# Patient Record
Sex: MALE | Race: WHITE | NOT HISPANIC OR LATINO
[De-identification: names, ages, dates, MRNs, and addresses within clinical notes are randomized per-mention and may not be internally consistent; named-entity substitution may affect disease eponyms.]

---

## 2018-11-07 ENCOUNTER — APPOINTMENT (OUTPATIENT)
Dept: UROLOGY | Facility: CLINIC | Age: 68
End: 2018-11-07
Payer: MEDICARE

## 2018-11-07 VITALS
BODY MASS INDEX: 23.74 KG/M2 | DIASTOLIC BLOOD PRESSURE: 70 MMHG | WEIGHT: 185 LBS | HEIGHT: 74 IN | SYSTOLIC BLOOD PRESSURE: 134 MMHG | HEART RATE: 65 BPM

## 2018-11-07 DIAGNOSIS — N28.1 CYST OF KIDNEY, ACQUIRED: ICD-10-CM

## 2018-11-07 DIAGNOSIS — Z80.1 FAMILY HISTORY OF MALIGNANT NEOPLASM OF TRACHEA, BRONCHUS AND LUNG: ICD-10-CM

## 2018-11-07 DIAGNOSIS — Z84.89 FAMILY HISTORY OF OTHER SPECIFIED CONDITIONS: ICD-10-CM

## 2018-11-07 DIAGNOSIS — R31.29 OTHER MICROSCOPIC HEMATURIA: ICD-10-CM

## 2018-11-07 DIAGNOSIS — N20.0 CALCULUS OF KIDNEY: ICD-10-CM

## 2018-11-07 LAB
BILIRUB UR QL STRIP: NORMAL
CLARITY UR: CLEAR
COLLECTION METHOD: NORMAL
GLUCOSE UR-MCNC: NORMAL
HCG UR QL: NORMAL EU/DL
HGB UR QL STRIP.AUTO: NORMAL
KETONES UR-MCNC: NORMAL
LEUKOCYTE ESTERASE UR QL STRIP: 25
NITRITE UR QL STRIP: NORMAL
PH UR STRIP: 5
PROT UR STRIP-MCNC: NORMAL
SP GR UR STRIP: 1025

## 2018-11-07 PROCEDURE — 99213 OFFICE O/P EST LOW 20 MIN: CPT

## 2018-11-07 PROCEDURE — 81003 URINALYSIS AUTO W/O SCOPE: CPT | Mod: QW

## 2019-11-12 ENCOUNTER — APPOINTMENT (OUTPATIENT)
Dept: UROLOGY | Facility: CLINIC | Age: 69
End: 2019-11-12
Payer: MEDICARE

## 2019-11-12 VITALS — BODY MASS INDEX: 23.74 KG/M2 | WEIGHT: 185 LBS | HEIGHT: 74 IN

## 2019-11-12 PROCEDURE — 99213 OFFICE O/P EST LOW 20 MIN: CPT

## 2019-11-12 NOTE — REVIEW OF SYSTEMS
[Fever] : no fever [Shortness Of Breath] : no shortness of breath [Chest Pain] : no chest pain [Abdominal Pain] : no abdominal pain [Confused] : no confusion [Dysuria] : no dysuria

## 2019-11-12 NOTE — PHYSICAL EXAM
[Prostate Tenderness] : the prostate was not tender [General Appearance - In No Acute Distress] : no acute distress [No Prostate Nodules] : no prostate nodules [Prostate Size ___ (0-4)] : prostate size [unfilled] (scale: 0-4) [Edema] : no peripheral edema [] : no respiratory distress [Oriented To Time, Place, And Person] : oriented to person, place, and time [Normal Station and Gait] : the gait and station were normal for the patient's age

## 2020-05-12 ENCOUNTER — APPOINTMENT (OUTPATIENT)
Dept: UROLOGY | Facility: CLINIC | Age: 70
End: 2020-05-12

## 2020-05-18 ENCOUNTER — APPOINTMENT (OUTPATIENT)
Dept: UROLOGY | Facility: CLINIC | Age: 70
End: 2020-05-18
Payer: MEDICARE

## 2020-05-18 ENCOUNTER — TRANSCRIPTION ENCOUNTER (OUTPATIENT)
Age: 70
End: 2020-05-18

## 2020-05-18 PROCEDURE — 99213 OFFICE O/P EST LOW 20 MIN: CPT | Mod: 95

## 2020-05-18 NOTE — PHYSICAL EXAM
[Normal Appearance] : normal appearance [General Appearance - In No Acute Distress] : no acute distress [Abdomen Tenderness] : non-tender [Costovertebral Angle Tenderness] : no ~M costovertebral angle tenderness [Edema] : no peripheral edema [] : no respiratory distress [Oriented To Time, Place, And Person] : oriented to person, place, and time

## 2020-05-18 NOTE — ASSESSMENT
[FreeTextEntry1] : Stable bph and luts with acceptable psa fully discussed with pt.  will continue to monitor it.  LUTS not bothersome enough to pt to want med or procedure.  He prefers behavioral modification re fliuid intake.  He attribute constipation to meds by cardiologist and prefers no treatment for it.  follow up re this with pmd

## 2020-05-18 NOTE — REVIEW OF SYSTEMS
[Constipation] : constipation [Fever] : no fever [Chest Pain] : no chest pain [Shortness Of Breath] : no shortness of breath [Cough] : no cough [Confused] : no confusion [Diarrhea] : no diarrhea

## 2020-05-18 NOTE — HISTORY OF PRESENT ILLNESS
[Home] : at home, [unfilled] , at the time of the visit. [Self] : self [Other Location: e.g. Home (Enter Location, City,State)___] : at [unfilled] [Patient] : the patient [Verbal consent obtained from patient] : the patient, [unfilled] [FreeTextEntry2] : at 1007 on 5/18/20 [FreeTextEntry1] : phone number [phone]\par email of [email or web address]\par \par 70 year old with bph and luts.  6 months ago, he had a increase in his psa to 3.0, from 2.1 2 years ago. PSA now is 3.2.  He has no change in urination with variable urinary flow which is normal in the daytime, and variable at night, with occaisional hesitancy.  He has nocturia x 0-5 but usually 0-2, he attributes it to water intake.  There is no daytime frequency or urgency

## 2020-11-25 ENCOUNTER — NON-APPOINTMENT (OUTPATIENT)
Age: 70
End: 2020-11-25

## 2020-12-15 ENCOUNTER — APPOINTMENT (OUTPATIENT)
Dept: UROLOGY | Facility: CLINIC | Age: 70
End: 2020-12-15

## 2021-05-25 ENCOUNTER — APPOINTMENT (OUTPATIENT)
Dept: UROLOGY | Facility: CLINIC | Age: 71
End: 2021-05-25
Payer: MEDICARE

## 2021-05-25 VITALS — HEIGHT: 74 IN | TEMPERATURE: 97.9 F | BODY MASS INDEX: 23.74 KG/M2 | WEIGHT: 185 LBS

## 2021-05-25 DIAGNOSIS — R97.20 ELEVATED PROSTATE, SPECIFIC ANTIGEN [PSA]: ICD-10-CM

## 2021-05-25 PROCEDURE — 99213 OFFICE O/P EST LOW 20 MIN: CPT

## 2021-05-25 NOTE — REVIEW OF SYSTEMS
[Fever] : no fever [Feeling Poorly] : not feeling poorly [Feeling Tired] : not feeling tired [Nasal Discharge] : no nasal discharge [Sore Throat] : no sore throat [Chest Pain] : no chest pain [Shortness Of Breath] : no shortness of breath [Cough] : no cough [Abdominal Pain] : no abdominal pain [Dysuria] : no dysuria

## 2021-05-25 NOTE — ASSESSMENT
[Urinary Symptom or Sign (788.99\R39.89)] : implantation [FreeTextEntry1] : BPH with lower urinary tract symptoms for which patient does not want any treatment and feels he can live with it. PSA increase above baseline and we'll repeat it. If still elevated I discussed with him I recommend prostate biopsy

## 2021-05-25 NOTE — HISTORY OF PRESENT ILLNESS
[FreeTextEntry1] : 71-year-old with history of BPH and lower urinary tract symptoms. Approximately 6 months ago had worsening urinary obstructive symptoms for which tamsulosin was prescribed but patient never took it. He states he urinates with a variable urinary flow with some hesitancy on occasion and nocturia x0 to, no dysuria, no hematuria. PSA now is 4.6 compared to 3.2 one year ago

## 2021-06-30 ENCOUNTER — APPOINTMENT (OUTPATIENT)
Dept: UROLOGY | Facility: CLINIC | Age: 71
End: 2021-06-30
Payer: MEDICARE

## 2021-06-30 PROCEDURE — 99213 OFFICE O/P EST LOW 20 MIN: CPT

## 2021-06-30 NOTE — PHYSICAL EXAM
[Normal Appearance] : normal appearance [General Appearance - In No Acute Distress] : no acute distress [] : no respiratory distress [Normal Station and Gait] : the gait and station were normal for the patient's age [Oriented To Time, Place, And Person] : oriented to person, place, and time

## 2021-06-30 NOTE — ASSESSMENT
[Urinary Symptom or Sign (788.99\R39.89)] : implantation [FreeTextEntry1] : Acceptable PSA consistent with BPH. No change in urination and patient prefers no treatment. Remote history of stones with no sign of symptoms lack of stone disease. Image p.r.n.

## 2021-06-30 NOTE — HISTORY OF PRESENT ILLNESS
[FreeTextEntry1] : 71-year-old with history of BPH, lower urinary tract symptoms. Patient states no change in urination with occasional hesitancy, nocturia x0, no dysuria, no hematuria. He is variable urinary flow. Recently PSA increased to 4.6 so it was repeated and is now 3.1 which is back to his baseline.\par \par Remote history of stones no flank pain

## 2022-08-03 ENCOUNTER — APPOINTMENT (OUTPATIENT)
Dept: UROLOGY | Facility: CLINIC | Age: 72
End: 2022-08-03

## 2022-08-03 VITALS
HEART RATE: 71 BPM | BODY MASS INDEX: 23.74 KG/M2 | WEIGHT: 185 LBS | DIASTOLIC BLOOD PRESSURE: 67 MMHG | SYSTOLIC BLOOD PRESSURE: 134 MMHG | HEIGHT: 74 IN

## 2022-08-03 PROCEDURE — 99213 OFFICE O/P EST LOW 20 MIN: CPT

## 2022-08-03 NOTE — HISTORY OF PRESENT ILLNESS
[FreeTextEntry1] : 72-year-old with history of BPH and lower urinary tract symptoms.  No changes in urination since last year.  Patient reports weak urinary stream during first void.  He is not bothered by urination does not want to take any daily medication.  Patient denies dysuria and gross hematuria.  Patient has history of fluctuating PSA.  Last year PSA increased to 4.6 ng/mL.  And repeat PSA was 3.1 ng/mL.  Most recent PSA done April 2022 was 3.7 ng/mL.

## 2022-08-03 NOTE — ASSESSMENT
[FreeTextEntry1] : Stable BPH.  PSA within fluctuating range.\par Follow-up 1 year with repeat PSA.\par \par This visit we discussed different treatment options for BPH including procedures and medications.  Benefits and side effects of treatment options reviewed in detail. Patient does not request any treatment at this time.  [Urinary Symptom or Sign (788.99\R39.89)] : implantation

## 2022-09-22 ENCOUNTER — APPOINTMENT (OUTPATIENT)
Dept: CARDIOLOGY | Facility: CLINIC | Age: 72
End: 2022-09-22

## 2022-09-22 PROCEDURE — 93306 TTE W/DOPPLER COMPLETE: CPT

## 2022-10-12 ENCOUNTER — NON-APPOINTMENT (OUTPATIENT)
Age: 72
End: 2022-10-12

## 2022-10-19 ENCOUNTER — APPOINTMENT (OUTPATIENT)
Dept: CARDIOLOGY | Facility: CLINIC | Age: 72
End: 2022-10-19

## 2022-10-19 VITALS — HEIGHT: 74 IN | WEIGHT: 189 LBS | BODY MASS INDEX: 24.26 KG/M2

## 2022-10-19 PROCEDURE — 99214 OFFICE O/P EST MOD 30 MIN: CPT

## 2022-10-19 RX ORDER — VERAPAMIL HYDROCHLORIDE 120 MG/1
120 TABLET ORAL
Refills: 0 | Status: DISCONTINUED | COMMUNITY
End: 2022-10-19

## 2022-10-19 RX ORDER — VALSARTAN 160 MG/1
160 TABLET, COATED ORAL
Qty: 30 | Refills: 0 | Status: DISCONTINUED | COMMUNITY
Start: 2022-10-07

## 2022-10-19 RX ORDER — TAMSULOSIN HYDROCHLORIDE 0.4 MG/1
0.4 CAPSULE ORAL
Qty: 30 | Refills: 5 | Status: DISCONTINUED | COMMUNITY
Start: 2020-11-25 | End: 2022-10-19

## 2022-10-19 RX ORDER — ASPIRIN 325 MG/1
TABLET, FILM COATED ORAL
Refills: 0 | Status: DISCONTINUED | COMMUNITY
End: 2022-10-19

## 2022-10-19 RX ORDER — VERAPAMIL HYDROCHLORIDE 240 MG/1
240 TABLET ORAL
Qty: 90 | Refills: 3 | Status: ACTIVE | COMMUNITY
Start: 2022-07-21 | End: 1900-01-01

## 2022-10-19 RX ORDER — VERAPAMIL HYDROCHLORIDE 240 MG/1
240 TABLET ORAL
Refills: 0 | Status: DISCONTINUED | COMMUNITY

## 2022-10-19 NOTE — HISTORY OF PRESENT ILLNESS
[FreeTextEntry1] : pt WITH HTN, HLD, NSVT ON HOLTER IN 2017 WITH SOME SVT EPISODES, HDL 36, HTN. HIGH 10 YEAR RISK \par \par pt bp elevated 150/90, pt says bp with pmd says bp was 105/60 and was hypotensive on 10/7/22 and patient was a little lightheaded. \par pt says sits for a minute, no infection at time and no fever BUT HAD A COUGH AND TREATED ABX. \par

## 2022-12-21 ENCOUNTER — APPOINTMENT (OUTPATIENT)
Dept: NEUROLOGY | Facility: CLINIC | Age: 72
End: 2022-12-21

## 2022-12-21 VITALS
HEART RATE: 70 BPM | HEIGHT: 74 IN | WEIGHT: 185 LBS | DIASTOLIC BLOOD PRESSURE: 81 MMHG | BODY MASS INDEX: 23.74 KG/M2 | SYSTOLIC BLOOD PRESSURE: 236 MMHG

## 2022-12-21 DIAGNOSIS — I99.8 OTHER DISORDER OF CIRCULATORY SYSTEM: ICD-10-CM

## 2022-12-21 PROCEDURE — 99203 OFFICE O/P NEW LOW 30 MIN: CPT

## 2022-12-21 NOTE — ASSESSMENT
[FreeTextEntry1] :  mild intravascular calcification- I assure patient this is most likely secondary to aging and not a concerning sign  suggestive of a neurological disorder.

## 2022-12-21 NOTE — HISTORY OF PRESENT ILLNESS
[FreeTextEntry1] : It's a pleasure to see Mr. NERISSA MARQUEZ In the office today. He is a 72 year -  old man  who presents to the office today Because the MRI of the brain that he did recently showed evidence of mild vascular calcification.   even though he was told that if not a significant finding, he wanted to hear it from a neurologist.  There was not any significant intracranial pathology on the MRI report.

## 2022-12-23 VITALS — SYSTOLIC BLOOD PRESSURE: 136 MMHG | HEART RATE: 70 BPM | DIASTOLIC BLOOD PRESSURE: 81 MMHG

## 2022-12-28 ENCOUNTER — NON-APPOINTMENT (OUTPATIENT)
Age: 72
End: 2022-12-28

## 2022-12-28 DIAGNOSIS — Z78.9 OTHER SPECIFIED HEALTH STATUS: ICD-10-CM

## 2023-04-20 ENCOUNTER — APPOINTMENT (OUTPATIENT)
Dept: CARDIOLOGY | Facility: CLINIC | Age: 73
End: 2023-04-20
Payer: MEDICARE

## 2023-04-20 VITALS — HEART RATE: 52 BPM | SYSTOLIC BLOOD PRESSURE: 120 MMHG | DIASTOLIC BLOOD PRESSURE: 70 MMHG

## 2023-04-20 VITALS — WEIGHT: 187 LBS | BODY MASS INDEX: 24 KG/M2 | HEIGHT: 74 IN

## 2023-04-20 DIAGNOSIS — I47.20 VENTRICULAR TACHYCARDIA, UNSPECIFIED: ICD-10-CM

## 2023-04-20 PROCEDURE — 99214 OFFICE O/P EST MOD 30 MIN: CPT

## 2023-04-20 NOTE — HISTORY OF PRESENT ILLNESS
[FreeTextEntry1] : pt WITH HTN, HLD, NSVT ON HOLTER IN 2017 WITH SOME SVT EPISODES, HDL 36, HTN. HIGH 10 YEAR RISK PREDM, bradycardia \par \par \par pt bp elevated 150/90, pt says bp with pmd says bp was 105/60 and was hypotensive on 10/7/22 and patient was a little lightheaded. \par pt says sits for a minute, no infection at time and no fever BUT HAD A COUGH AND TREATED ABX. \par \par 23: \par 23: HDL 37, LDl 73, T A1c: 5.7 GFR: 93, NTBNP: 72, \par pt with predm and hdl lwo and only walking briskly works in [employer] b ut denies cp or sob. palpitaitons controlled on verapamil and rare correctly.

## 2023-04-20 NOTE — DISCUSSION/SUMMARY
[FreeTextEntry1] : mild anemia \par f/u with pmd \par colonoscopy negative last year \par pt with jose a but ? due to verapamil \par continue statin \par exercise for HDL \par get 2 d echo \par bloodwork f/u in 6 months

## 2023-04-20 NOTE — HISTORY OF PRESENT ILLNESS
[FreeTextEntry1] : pt WITH HTN, HLD, NSVT ON HOLTER IN 2017 WITH SOME SVT EPISODES, HDL 36, HTN. HIGH 10 YEAR RISK PREDM, bradycardia \par \par \par pt bp elevated 150/90, pt says bp with pmd says bp was 105/60 and was hypotensive on 10/7/22 and patient was a little lightheaded. \par pt says sits for a minute, no infection at time and no fever BUT HAD A COUGH AND TREATED ABX. \par \par 23: \par 23: HDL 37, LDl 73, T A1c: 5.7 GFR: 93, NTBNP: 72, \par pt with predm and hdl lwo and only walking briskly works in [employer] b ut denies cp or sob. palpitaitons controlled on verapamil and rare correctly.

## 2023-08-02 ENCOUNTER — APPOINTMENT (OUTPATIENT)
Dept: UROLOGY | Facility: CLINIC | Age: 73
End: 2023-08-02
Payer: MEDICARE

## 2023-08-02 VITALS
HEIGHT: 74 IN | DIASTOLIC BLOOD PRESSURE: 72 MMHG | OXYGEN SATURATION: 98 % | SYSTOLIC BLOOD PRESSURE: 142 MMHG | WEIGHT: 187 LBS | HEART RATE: 59 BPM | RESPIRATION RATE: 18 BRPM | BODY MASS INDEX: 24 KG/M2 | TEMPERATURE: 98.3 F

## 2023-08-02 DIAGNOSIS — R39.9 UNSPECIFIED SYMPTOMS AND SIGNS INVOLVING THE GENITOURINARY SYSTEM: ICD-10-CM

## 2023-08-02 DIAGNOSIS — N40.1 BENIGN PROSTATIC HYPERPLASIA WITH LOWER URINARY TRACT SYMPMS: ICD-10-CM

## 2023-08-02 DIAGNOSIS — N13.8 BENIGN PROSTATIC HYPERPLASIA WITH LOWER URINARY TRACT SYMPMS: ICD-10-CM

## 2023-08-02 PROCEDURE — 99213 OFFICE O/P EST LOW 20 MIN: CPT

## 2023-08-02 NOTE — HISTORY OF PRESENT ILLNESS
[FreeTextEntry1] : Patient is a 73-year-old history of BPH and lower urinary tract symptoms.  No change in urination since last year.  He reports weak urinary stream during first void.  He continues to not be bothered by this and does not want any treatment.  He denies any dysuria, gross hematuria.  Patient does have history of fluctuating PSA as high as 4.6 ng/mL.  His PSA April 2022 was 3.7 ng/mL.  PSA July 2023 is 3.2 ng/mL.

## 2023-08-02 NOTE — ASSESSMENT
[FreeTextEntry1] :  Urinary symptoms stable.  PSA stable.  Follow-up 1 year with repeat PSA [Urinary Symptom or Sign (788.99\R39.89)] : implantation

## 2023-09-01 ENCOUNTER — APPOINTMENT (OUTPATIENT)
Dept: ORTHOPEDIC SURGERY | Facility: CLINIC | Age: 73
End: 2023-09-01
Payer: MEDICARE

## 2023-09-01 DIAGNOSIS — S33.5XXA SPRAIN OF LIGAMENTS OF LUMBAR SPINE, INITIAL ENCOUNTER: ICD-10-CM

## 2023-09-01 PROCEDURE — 99203 OFFICE O/P NEW LOW 30 MIN: CPT

## 2023-09-01 NOTE — PHYSICAL EXAM
[de-identified] : TTP midline spine and paraspinal musculature  Strength                                          Hip flexor   Right: 5/5; Left: 5/5                              Knee extensor     Right: 5/5; Left: 5/5                      Ankle dorsiflexion   Right: 5/5; Left: 5/5                   EHL           Right: 5/5; Left: 5/5                                 Ankle plantarflexion       Right: 5/5; Left: 5/5  Sensation L1   Right: 2/2; Left: 2/2 L2   Right: 2/2; Left: 2/2 L3   Right: 2/2; Left: 2/2 L4   Right: 2/2; Left: 2/2 L5   Right: 2/2; Left: 2/2 S1   Right: 2/2; Left: 2/2  Reflexes Patella   Right: 2+; Left 2+ Achilles   Right: 2+; Left 2+ Clonus  Right: absent; L: absent

## 2023-09-01 NOTE — DATA REVIEWED
[FreeTextEntry1] : I reviewed the patient's AP lateral and oblique lumbar x-rays as well as AP and lateral thoracic x-rays.  There is disc degeneration.  No instability no fractures no dislocations.

## 2023-09-01 NOTE — DISCUSSION/SUMMARY
[de-identified] : 73-year-old male with 3 weeks history of low back pain superimposed on very mild pains in his low back for many years now.  Given patient prescription for physical therapy.  I discussed with him this is mostly a lumbar sprain with some mild degeneration.  If he continues to have pain and follow-up with his wife's pain management doctor.  He said he cannot take anti-inflammatories I advised him to take Tylenol and cyclobenzaprine as needed.  No surgical intervention.

## 2023-09-01 NOTE — HISTORY OF PRESENT ILLNESS
[de-identified] : 73-year-old male presents with 3 weeks of low back pain.  Patient went to urgent care.  They ordered an x-rays.  He has those with him.  They gave him a prescription for cyclobenzaprine.  He denies any pain radiating down his legs.  Denies numbness or tingling.  He has not been to physical therapy yet. He has not been to pain management yet.  No loss of bowel or bowel.

## 2023-10-19 ENCOUNTER — APPOINTMENT (OUTPATIENT)
Dept: CARDIOLOGY | Facility: CLINIC | Age: 73
End: 2023-10-19
Payer: MEDICARE

## 2023-10-19 VITALS — HEART RATE: 61 BPM | DIASTOLIC BLOOD PRESSURE: 90 MMHG | SYSTOLIC BLOOD PRESSURE: 160 MMHG

## 2023-10-19 VITALS — BODY MASS INDEX: 23.1 KG/M2 | OXYGEN SATURATION: 97 % | HEIGHT: 74 IN | HEART RATE: 61 BPM | WEIGHT: 180 LBS

## 2023-10-19 DIAGNOSIS — R93.1 ABNORMAL FINDINGS ON DIAGNOSTIC IMAGING OF HEART AND CORONARY CIRCULATION: ICD-10-CM

## 2023-10-19 PROCEDURE — 93306 TTE W/DOPPLER COMPLETE: CPT

## 2023-10-19 PROCEDURE — 99214 OFFICE O/P EST MOD 30 MIN: CPT

## 2023-10-19 RX ORDER — VALSARTAN 320 MG/1
320 TABLET, COATED ORAL DAILY
Qty: 90 | Refills: 3 | Status: ACTIVE | COMMUNITY
Start: 1900-01-01 | End: 1900-01-01

## 2023-10-25 PROBLEM — R93.1 ABNORMAL ECHOCARDIOGRAM: Status: ACTIVE | Noted: 2023-10-25

## 2023-10-26 ENCOUNTER — APPOINTMENT (OUTPATIENT)
Dept: CARDIOLOGY | Facility: CLINIC | Age: 73
End: 2023-10-26
Payer: MEDICARE

## 2023-10-26 VITALS — DIASTOLIC BLOOD PRESSURE: 80 MMHG | HEART RATE: 70 BPM | SYSTOLIC BLOOD PRESSURE: 138 MMHG

## 2023-10-26 VITALS — WEIGHT: 180 LBS | HEIGHT: 74 IN | BODY MASS INDEX: 23.1 KG/M2

## 2023-10-26 PROCEDURE — 99213 OFFICE O/P EST LOW 20 MIN: CPT

## 2023-10-26 RX ORDER — METOPROLOL SUCCINATE 50 MG/1
50 TABLET, EXTENDED RELEASE ORAL DAILY
Qty: 90 | Refills: 3 | Status: ACTIVE | COMMUNITY
Start: 2023-10-26 | End: 1900-01-01

## 2023-10-26 RX ORDER — KRILL/OM-3/DHA/EPA/PHOSPHO/AST 1000-230MG
81 CAPSULE ORAL
Refills: 0 | Status: ACTIVE | COMMUNITY

## 2023-11-02 ENCOUNTER — TRANSCRIPTION ENCOUNTER (OUTPATIENT)
Age: 73
End: 2023-11-02

## 2023-11-16 ENCOUNTER — APPOINTMENT (OUTPATIENT)
Dept: CARDIOLOGY | Facility: CLINIC | Age: 73
End: 2023-11-16
Payer: MEDICARE

## 2023-11-16 PROCEDURE — 93978 VASCULAR STUDY: CPT

## 2023-12-20 ENCOUNTER — OUTPATIENT (OUTPATIENT)
Dept: OUTPATIENT SERVICES | Facility: HOSPITAL | Age: 73
LOS: 1 days | End: 2023-12-20
Payer: MEDICARE

## 2023-12-20 ENCOUNTER — RESULT REVIEW (OUTPATIENT)
Age: 73
End: 2023-12-20

## 2023-12-20 DIAGNOSIS — Z00.8 ENCOUNTER FOR OTHER GENERAL EXAMINATION: ICD-10-CM

## 2023-12-20 DIAGNOSIS — R93.1 ABNORMAL FINDINGS ON DIAGNOSTIC IMAGING OF HEART AND CORONARY CIRCULATION: ICD-10-CM

## 2023-12-20 PROCEDURE — 75574 CT ANGIO HRT W/3D IMAGE: CPT

## 2023-12-20 PROCEDURE — 75574 CT ANGIO HRT W/3D IMAGE: CPT | Mod: 26,MH

## 2023-12-21 DIAGNOSIS — R93.1 ABNORMAL FINDINGS ON DIAGNOSTIC IMAGING OF HEART AND CORONARY CIRCULATION: ICD-10-CM

## 2023-12-21 NOTE — HISTORY OF PRESENT ILLNESS
[FreeTextEntry1] : pt WITH HTN, HLD, NSVT ON HOLTER IN 2017 WITH SOME SVT EPISODES, HDL 36, HTN. HIGH 10 YEAR RISK PREDM, bradycardia   pt bp elevated 150/90, pt says bp with pmd says bp was 105/60 and was hypotensive on 10/7/22 and patient was a little lightheaded.  pt says sits for a minute, no infection at time and no fever BUT HAD A COUGH AND TREATED ABX.   23:  23: HDL 37, LDL73, T A1c: 5.7 GFR: 93, NTBNP: 72,  pt with predm and hdl lwo and only walking briskly works in [employer] but denies cp or sob. palpitaitons controlled on verapamil and rare currently  10/19/23: pt had bloodwork done 2 days ago and not received yet. pt had echo today. pt had back issues and had ct scan done and reviewed. CT SHOWED CALCIFACTION IN AORTA. Pt working and walking with no issues, pt was predm and tried to correct but A1c was 6 on last test.  pt taking verpamil/valsartan/atorvastatin and bp high today.  HDL: 36, T, LDL: 69, APOB: 67, LPA: 33, A1C: 5.5  10/19/23: EF: 65%, LVMI: 106, E' sept: 0.1, E/e': 8, ? sept hypokinesis, DD1, mild LAE, mild JANNET, mild TR, mild LVH. Patient given echo results. CTA ordered for ? sept hypokinesis.   10/25/23: Patient here for BP check Valsartan increased last visit to 320mg.   23: d/w patient CTA: mild CAD 50% in 3 vessels, CAC: 68 continue atorvastatin 10 mg po qhs and bp control

## 2023-12-21 NOTE — DISCUSSION/SUMMARY
[FreeTextEntry1] : mild anemia f/u with pmd  colonoscopy negative last year  pt with jose a at times but asymptomatic but ? due to verapamil  continue atorvastatin 10  will call for bloodwork results   exercise for HDL  f/u in echo  increase valsartan to 320 mg po qd  check abdominal aorta does not seem dilated on exam.   10/26/23:  Start Metoprolol Succinate 50mg po qd as bp is high normal.

## 2024-01-17 ENCOUNTER — APPOINTMENT (OUTPATIENT)
Dept: CARDIOLOGY | Facility: CLINIC | Age: 74
End: 2024-01-17
Payer: MEDICARE

## 2024-01-17 PROCEDURE — 93880 EXTRACRANIAL BILAT STUDY: CPT

## 2024-01-17 PROCEDURE — 93975 VASCULAR STUDY: CPT

## 2024-01-19 ENCOUNTER — APPOINTMENT (OUTPATIENT)
Dept: CARDIOLOGY | Facility: CLINIC | Age: 74
End: 2024-01-19
Payer: MEDICARE

## 2024-01-19 VITALS — HEIGHT: 74 IN | BODY MASS INDEX: 23.1 KG/M2 | WEIGHT: 180 LBS

## 2024-01-19 VITALS — SYSTOLIC BLOOD PRESSURE: 130 MMHG | DIASTOLIC BLOOD PRESSURE: 80 MMHG

## 2024-01-19 DIAGNOSIS — Z00.00 ENCOUNTER FOR GENERAL ADULT MEDICAL EXAMINATION W/OUT ABNORMAL FINDINGS: ICD-10-CM

## 2024-01-19 DIAGNOSIS — I11.9 HYPERTENSIVE HEART DISEASE W/OUT HEART FAILURE: ICD-10-CM

## 2024-01-19 DIAGNOSIS — I34.0 NONRHEUMATIC MITRAL (VALVE) INSUFFICIENCY: ICD-10-CM

## 2024-01-19 PROCEDURE — 99214 OFFICE O/P EST MOD 30 MIN: CPT

## 2024-01-19 RX ORDER — METOPROLOL TARTRATE 100 MG/1
100 TABLET, FILM COATED ORAL
Qty: 2 | Refills: 0 | Status: DISCONTINUED | COMMUNITY
Start: 2023-10-25 | End: 2024-01-19

## 2024-02-14 ENCOUNTER — APPOINTMENT (OUTPATIENT)
Dept: CARDIOLOGY | Facility: CLINIC | Age: 74
End: 2024-02-14

## 2024-02-15 NOTE — DISCUSSION/SUMMARY
[FreeTextEntry1] : mild anemia f/u with pmd  colonoscopy negative last year  pt with jose a at times but asymptomatic but ? due to verapamil  exercise for HDL  continue valsartan to 320 mg po qd  continue Metoprolol Succinate 50mg po qd as bp is high normal. last LDL 69 will increase atorvastatin to 20 mg po qhs as mild CAD  f/u in 4 months bloodwork

## 2024-02-15 NOTE — HISTORY OF PRESENT ILLNESS
[FreeTextEntry1] : pt WITH : CTA: mild TVDz, CAC 68, HTN, HLD, NSVT ON HOLTER IN 2017 WITH SOME SVT EPISODES, HDL 36, HTN. HIGH 10 YEAR RISK PREDM, bradycardia   pt bp elevated 150/90, pt says bp with pmd says bp was 105/60 and was hypotensive on 10/7/22 and patient was a little lightheaded.  pt says sits for a minute, no infection at time and no fever BUT HAD A COUGH AND TREATED ABX.   23:  23: HDL 37, LDL73, T A1c: 5.7 GFR: 93, NTBNP: 72,  pt with predm and hdl lwo and only walking briskly works in [employer] but denies cp or sob. palpitaitons controlled on verapamil and rare currently  10/19/23: pt had bloodwork done 2 days ago and not received yet. pt had echo today. pt had back issues and had ct scan done and reviewed. CT SHOWED CALCIFACTION IN AORTA. Pt working and walking with no issues, pt was predm and tried to correct but A1c was 6 on last test.  pt taking verpamil/valsartan/atorvastatin and bp high today.  HDL: 36, T, LDL: 69, APOB: 67, LPA: 33, A1C: 5.5  10/19/23: EF: 65%, LVMI: 106, E' sept: 0.1, E/e': 8, ? sept hypokinesis, DD1, mild LAE, mild JANNET, mild TR, mild LVH. Patient given echo results. CTA ordered for ? sept hypokinesis.   10/25/23: Patient here for BP check Valsartan increased last visit to 320mg.   24: 23: No evidence of abdominal aortic aneurysm. Normal common and external iliac arteries bilaterally with multiphasic flow. 23: d/w patient CTA: mild CAD 50% in 3 vessels, CAC: 68 continue atorvastatin 10 mg po qhs and bp control  renal u/s: less than 60% b/l ica  carotid u/s: 50% b/l ica.  Patient denies cp, sob, dizziness. Patient c/o palpitations that are like skipped beats but similar to complaints he had in the past. Patient states most active thing he does is fast walking [place] city. Patient walks 3 blocks from boat [place] train daily.   24:  2/15/24: A1C: 6, LDL: 68, HDL: 37, T 2/15/24:

## 2024-04-13 ENCOUNTER — NON-APPOINTMENT (OUTPATIENT)
Age: 74
End: 2024-04-13

## 2024-05-17 ENCOUNTER — APPOINTMENT (OUTPATIENT)
Dept: CARDIOLOGY | Facility: CLINIC | Age: 74
End: 2024-05-17
Payer: MEDICARE

## 2024-05-17 VITALS — BODY MASS INDEX: 22.46 KG/M2 | HEIGHT: 74 IN | WEIGHT: 175 LBS

## 2024-05-17 VITALS — SYSTOLIC BLOOD PRESSURE: 146 MMHG | DIASTOLIC BLOOD PRESSURE: 80 MMHG | HEART RATE: 43 BPM

## 2024-05-17 DIAGNOSIS — R73.03 PREDIABETES.: ICD-10-CM

## 2024-05-17 PROCEDURE — 99214 OFFICE O/P EST MOD 30 MIN: CPT

## 2024-05-17 PROCEDURE — G2211 COMPLEX E/M VISIT ADD ON: CPT

## 2024-05-17 PROCEDURE — ZZZZZ: CPT

## 2024-05-17 PROCEDURE — 93000 ELECTROCARDIOGRAM COMPLETE: CPT

## 2024-05-17 RX ORDER — ATORVASTATIN CALCIUM 40 MG/1
40 TABLET, FILM COATED ORAL
Qty: 90 | Refills: 3 | Status: ACTIVE | COMMUNITY
Start: 1900-01-01 | End: 1900-01-01

## 2024-05-17 RX ORDER — CHLORTHALIDONE 25 MG/1
25 TABLET ORAL
Qty: 90 | Refills: 3 | Status: ACTIVE | COMMUNITY
Start: 2024-05-17 | End: 1900-01-01

## 2024-05-17 NOTE — DISCUSSION/SUMMARY
[EKG obtained to assist in diagnosis and management of assessed problem(s)] : EKG obtained to assist in diagnosis and management of assessed problem(s) [FreeTextEntry1] : mild anemia f/u with pmd  pt with jose a at times but asymptomatic  to 43  palpitations controlled with verapamil 240 qd  exercise for HDL  continue valsartan to 320 mg po qd  mild CAD  increase atorvastatin to 40 mg po qhs  STOP METOPROLOL ER 50 as jose a and will get MCOT  if palpitations resume will deal with bradycardia.  chlorthalidone 25mg daily fro bp  f/u in 6 weeks.

## 2024-05-17 NOTE — HISTORY OF PRESENT ILLNESS
[FreeTextEntry1] : pt WITH : CTA: mild TVDz, CAC 68, HTN, HLD, NSVT ON HOLTER IN 2017 WITH SOME SVT EPISODES, HDL 36, LVH, HTN. HIGH 10 YEAR RISK PREDM, bradycardia   23:  23: HDL 37, LDL73, T A1c: 5.7 GFR: 93, NTBNP: 72,  pt with predm and hdl low and only walking briskly works in [employer] but denies cp or sob. palpitaitons controlled on verapamil and rare currently  10/19/23: pt had bloodwork done 2 days ago and not received yet. pt had echo today. pt had back issues and had ct scan done and reviewed. CT SHOWED CALCIFACTION IN AORTA. Pt working and walking with no issues, pt was predm and tried to correct but A1c was 6 on last test.  pt taking verpamil/valsartan/atorvastatin and bp high today.  HDL: 36, T, LDL: 69, APOB: 67, LPA: 33, A1C: 5.5  10/19/23: EF: 65%, LVMI: 106, E' sept: 0.1, E/e': 8, ? sept hypokinesis, DD1, mild LAE, mild JANNET, mild TR, mild LVH. Patient given echo results. CTA ordered for ? sept hypokinesis.   10/25/23: Patient here for BP check Valsartan increased last visit to 320mg.   24: 23: No evidence of abdominal aortic aneurysm. Normal common and external iliac arteries bilaterally with multiphasic flow. 23: d/w patient CTA: mild CAD 50% in 3 vessels, CAC: 68  renal u/s: less than 60% b/l ica  carotid u/s: 50% b/l ica.  Patient denies cp, sob, dizziness. Patient c/o palpitations that are like skipped beats but similar to complaints he had in the past. Patient states most active thing he does is fast walking [place] the city. Patient walks 3 blocks from boat to train daily.   24:  2/15/24: A1C: 6 increasing from 5.5, LDL: 68, HDL: 37, T atorvastatin increased to 20 mg po qhs and LDL 69 to 68 in 1 month.  pt moved to [place].  pt has low hr to 40's on ekg but on meds HE HAS NO PALPITATIONS.  5/10/24: A1C: 6.1 ldl 65: BNP: 304 Running in place increased hr 43 to 55 bpm.

## 2024-06-27 PROBLEM — R00.2 PALPITATIONS: Status: ACTIVE | Noted: 2022-10-18

## 2024-06-27 PROBLEM — R00.1 BRADYCARDIA: Status: ACTIVE | Noted: 2023-04-20

## 2024-06-27 PROBLEM — I65.23 ARTERIOSCLEROSIS OF BOTH CAROTID ARTERIES: Status: ACTIVE | Noted: 2023-04-20

## 2024-06-27 PROBLEM — E78.2 MIXED HYPERLIPIDEMIA: Status: ACTIVE | Noted: 2022-10-18

## 2024-06-27 PROBLEM — I10 ESSENTIAL (PRIMARY) HYPERTENSION: Status: ACTIVE | Noted: 2022-10-18

## 2024-06-27 PROBLEM — I25.10 CORONARY ARTERY STENOSIS: Status: ACTIVE | Noted: 2024-05-17

## 2024-06-27 PROBLEM — R93.1 ELEVATED CORONARY ARTERY CALCIUM SCORE: Status: ACTIVE | Noted: 2024-05-17

## 2024-06-28 ENCOUNTER — APPOINTMENT (OUTPATIENT)
Dept: CARDIOLOGY | Facility: CLINIC | Age: 74
End: 2024-06-28
Payer: MEDICARE

## 2024-06-28 VITALS — HEIGHT: 74 IN | WEIGHT: 175 LBS | BODY MASS INDEX: 22.46 KG/M2

## 2024-06-28 DIAGNOSIS — R00.2 PALPITATIONS: ICD-10-CM

## 2024-06-28 DIAGNOSIS — I25.10 ATHEROSCLEROTIC HEART DISEASE OF NATIVE CORONARY ARTERY W/OUT ANGINA PECTORIS: ICD-10-CM

## 2024-06-28 DIAGNOSIS — I10 ESSENTIAL (PRIMARY) HYPERTENSION: ICD-10-CM

## 2024-06-28 DIAGNOSIS — E78.2 MIXED HYPERLIPIDEMIA: ICD-10-CM

## 2024-06-28 DIAGNOSIS — R00.1 BRADYCARDIA, UNSPECIFIED: ICD-10-CM

## 2024-06-28 DIAGNOSIS — I65.23 OCCLUSION AND STENOSIS OF BILATERAL CAROTID ARTERIES: ICD-10-CM

## 2024-06-28 DIAGNOSIS — R93.1 ABNORMAL FINDINGS ON DIAGNOSTIC IMAGING OF HEART AND CORONARY CIRCULATION: ICD-10-CM

## 2024-06-28 PROCEDURE — 99213 OFFICE O/P EST LOW 20 MIN: CPT

## 2024-08-14 ENCOUNTER — APPOINTMENT (OUTPATIENT)
Dept: UROLOGY | Facility: CLINIC | Age: 74
End: 2024-08-14
Payer: MEDICARE

## 2024-08-14 DIAGNOSIS — R39.9 UNSPECIFIED SYMPTOMS AND SIGNS INVOLVING THE GENITOURINARY SYSTEM: ICD-10-CM

## 2024-08-14 DIAGNOSIS — R97.20 ELEVATED PROSTATE, SPECIFIC ANTIGEN [PSA]: ICD-10-CM

## 2024-08-14 DIAGNOSIS — N13.8 BENIGN PROSTATIC HYPERPLASIA WITH LOWER URINARY TRACT SYMPMS: ICD-10-CM

## 2024-08-14 DIAGNOSIS — N40.1 BENIGN PROSTATIC HYPERPLASIA WITH LOWER URINARY TRACT SYMPMS: ICD-10-CM

## 2024-08-14 LAB
BILIRUB UR QL STRIP: NORMAL
COLLECTION METHOD: NORMAL
GLUCOSE UR-MCNC: NORMAL
HCG UR QL: 0.2 EU/DL
HGB UR QL STRIP.AUTO: NORMAL
KETONES UR-MCNC: NORMAL
LEUKOCYTE ESTERASE UR QL STRIP: NORMAL
NITRITE UR QL STRIP: NORMAL
PH UR STRIP: 7
PROT UR STRIP-MCNC: NORMAL
SP GR UR STRIP: 1.02

## 2024-08-14 PROCEDURE — 99214 OFFICE O/P EST MOD 30 MIN: CPT

## 2024-08-14 NOTE — END OF VISIT
[FreeTextEntry3] : I obtained history/physical, formulated treatment plan with I discussed with patient agree with above transcription by the physicians assistant

## 2024-08-14 NOTE — PHYSICAL EXAM
[General Appearance - In No Acute Distress] : no acute distress [Normal Station and Gait] : the gait and station were normal for the patient's age [] : no respiratory distress [No Focal Deficits] : no focal deficits [Oriented To Time, Place, And Person] : oriented to person, place, and time

## 2024-08-14 NOTE — ASSESSMENT
[FreeTextEntry1] : Urinary symptoms stable. PSA up trended since his last visit.  Recommend repeat PSA and if still above his baseline, recommend MR prostate to evaluate for any suspicious prostatic lesions that we will be targeted on MRI guided fusion biopsy.  Will schedule 6-week follow-up.  Patient will call for PSA results.  If PSA returns to baseline, cancel 6-week follow-up and follow-up 1 year for repeat PSA. [Urinary Symptom or Sign (788.99\R39.89)] : implantation

## 2024-09-12 ENCOUNTER — OUTPATIENT (OUTPATIENT)
Dept: OUTPATIENT SERVICES | Facility: HOSPITAL | Age: 74
LOS: 1 days | End: 2024-09-12
Payer: MEDICARE

## 2024-09-12 DIAGNOSIS — R97.20 ELEVATED PROSTATE SPECIFIC ANTIGEN [PSA]: ICD-10-CM

## 2024-09-12 PROCEDURE — 72197 MRI PELVIS W/O & W/DYE: CPT | Mod: 26,MH

## 2024-09-12 PROCEDURE — A9579: CPT

## 2024-09-12 PROCEDURE — 72197 MRI PELVIS W/O & W/DYE: CPT

## 2024-09-13 DIAGNOSIS — R97.20 ELEVATED PROSTATE SPECIFIC ANTIGEN [PSA]: ICD-10-CM

## 2024-09-30 ENCOUNTER — NON-APPOINTMENT (OUTPATIENT)
Age: 74
End: 2024-09-30

## 2024-10-01 ENCOUNTER — APPOINTMENT (OUTPATIENT)
Dept: UROLOGY | Facility: CLINIC | Age: 74
End: 2024-10-01
Payer: MEDICARE

## 2024-10-01 VITALS
HEIGHT: 74 IN | BODY MASS INDEX: 22.46 KG/M2 | WEIGHT: 175 LBS | DIASTOLIC BLOOD PRESSURE: 71 MMHG | OXYGEN SATURATION: 97 % | HEART RATE: 75 BPM | SYSTOLIC BLOOD PRESSURE: 126 MMHG | RESPIRATION RATE: 17 BRPM | TEMPERATURE: 98 F

## 2024-10-01 DIAGNOSIS — R97.20 ELEVATED PROSTATE, SPECIFIC ANTIGEN [PSA]: ICD-10-CM

## 2024-10-01 DIAGNOSIS — R82.90 UNSPECIFIED ABNORMAL FINDINGS IN URINE: ICD-10-CM

## 2024-10-01 DIAGNOSIS — N40.1 BENIGN PROSTATIC HYPERPLASIA WITH LOWER URINARY TRACT SYMPMS: ICD-10-CM

## 2024-10-01 DIAGNOSIS — N13.8 BENIGN PROSTATIC HYPERPLASIA WITH LOWER URINARY TRACT SYMPMS: ICD-10-CM

## 2024-10-01 DIAGNOSIS — R39.9 UNSPECIFIED SYMPTOMS AND SIGNS INVOLVING THE GENITOURINARY SYSTEM: ICD-10-CM

## 2024-10-01 PROCEDURE — 99213 OFFICE O/P EST LOW 20 MIN: CPT

## 2024-10-01 PROCEDURE — G2211 COMPLEX E/M VISIT ADD ON: CPT

## 2024-10-01 NOTE — HISTORY OF PRESENT ILLNESS
[FreeTextEntry1] : Patient is a 74-year-old with history of BPH and lower urinary tract symptoms.  Not bothersome to the patient and does not want any treatment.  His only complaint is weak urinary stream during his first void.  He denies any dysuria and any gross hematuria.  History of fluctuating PSA as high as 4.6 ng/mL. July 2023 his PSA was 3.2 ng/mL.  PSA June 2024 is 4.2 ng/mL.  MRI of prostate done August 28, 2024 shows no suspicious lesions, PI-RADS 2.  Prostate volume 114 cc giving him a PSA density of 0.03.

## 2024-10-01 NOTE — ASSESSMENT
[FreeTextEntry1] : 74-year-old with elevated PSA.  No suspicious lesions on MRI with a 114 cc prostate consistent with BPH..  PSA density 0.03. Significance of PSA density reviewed.  Recommend continue trending PSA and will repeat in 6 months and follow-up to review.  Lower urinary tract symptoms will be continued to be monitored [Urinary Symptom or Sign (788.99\R39.89)] : implantation

## 2024-10-02 LAB
BILIRUB UR QL STRIP: NORMAL
COLLECTION METHOD: NORMAL
GLUCOSE UR-MCNC: NORMAL
HCG UR QL: 0.2 EU/DL
HGB UR QL STRIP.AUTO: NORMAL
KETONES UR-MCNC: NORMAL
LEUKOCYTE ESTERASE UR QL STRIP: NORMAL
NITRITE UR QL STRIP: NORMAL
PH UR STRIP: 5.5
PROT UR STRIP-MCNC: NORMAL
SP GR UR STRIP: 1.02

## 2024-11-01 ENCOUNTER — APPOINTMENT (OUTPATIENT)
Dept: CARDIOLOGY | Facility: CLINIC | Age: 74
End: 2024-11-01
Payer: MEDICARE

## 2024-11-01 VITALS
WEIGHT: 178 LBS | HEART RATE: 73 BPM | BODY MASS INDEX: 22.84 KG/M2 | HEIGHT: 74 IN | DIASTOLIC BLOOD PRESSURE: 53 MMHG | SYSTOLIC BLOOD PRESSURE: 113 MMHG

## 2024-11-01 DIAGNOSIS — R73.03 PREDIABETES.: ICD-10-CM

## 2024-11-01 DIAGNOSIS — R00.1 BRADYCARDIA, UNSPECIFIED: ICD-10-CM

## 2024-11-01 DIAGNOSIS — I25.10 ATHEROSCLEROTIC HEART DISEASE OF NATIVE CORONARY ARTERY W/OUT ANGINA PECTORIS: ICD-10-CM

## 2024-11-01 DIAGNOSIS — I34.0 NONRHEUMATIC MITRAL (VALVE) INSUFFICIENCY: ICD-10-CM

## 2024-11-01 DIAGNOSIS — E78.2 MIXED HYPERLIPIDEMIA: ICD-10-CM

## 2024-11-01 DIAGNOSIS — I10 ESSENTIAL (PRIMARY) HYPERTENSION: ICD-10-CM

## 2024-11-01 DIAGNOSIS — I65.23 OCCLUSION AND STENOSIS OF BILATERAL CAROTID ARTERIES: ICD-10-CM

## 2024-11-01 PROCEDURE — 99214 OFFICE O/P EST MOD 30 MIN: CPT

## 2024-11-01 PROCEDURE — G2211 COMPLEX E/M VISIT ADD ON: CPT

## 2024-11-01 RX ORDER — EZETIMIBE 10 MG/1
10 TABLET ORAL
Qty: 90 | Refills: 3 | Status: ACTIVE | COMMUNITY
Start: 2024-11-01 | End: 1900-01-01

## 2024-12-13 NOTE — HISTORY OF PRESENT ILLNESS
[FreeTextEntry1] : Patient is a 74-year-old history of BPH and lower urinary tract symptoms.  No change in urination since his last visit.  He continues to have weak urinary stream during first void but urinates well throughout the rest of the day.  He is not bothered by this and does not want any treatment at this time.  He denies any dysuria and gross hematuria.  History of fluctuating PSA as high as 4.6 ng/mL.  July 2023 his PSA was 3.2 ng/mL.  His most recent PSA June 2024 is 4.2 ng/mL.
[FreeTextEntry1] : Patient is a 74-year-old history of BPH and lower urinary tract symptoms.  No change in urination since his last visit.  He continues to have weak urinary stream during first void but urinates well throughout the rest of the day.  He is not bothered by this and does not want any treatment at this time.  He denies any dysuria and gross hematuria.  History of fluctuating PSA as high as 4.6 ng/mL.  July 2023 his PSA was 3.2 ng/mL.  His most recent PSA June 2024 is 4.2 ng/mL.
No

## 2025-03-04 ENCOUNTER — APPOINTMENT (OUTPATIENT)
Dept: CARDIOLOGY | Facility: CLINIC | Age: 75
End: 2025-03-04

## 2025-04-01 ENCOUNTER — APPOINTMENT (OUTPATIENT)
Dept: UROLOGY | Facility: CLINIC | Age: 75
End: 2025-04-01
Payer: MEDICARE

## 2025-04-01 DIAGNOSIS — N40.1 BENIGN PROSTATIC HYPERPLASIA WITH LOWER URINARY TRACT SYMPMS: ICD-10-CM

## 2025-04-01 DIAGNOSIS — R39.9 UNSPECIFIED SYMPTOMS AND SIGNS INVOLVING THE GENITOURINARY SYSTEM: ICD-10-CM

## 2025-04-01 DIAGNOSIS — N13.8 BENIGN PROSTATIC HYPERPLASIA WITH LOWER URINARY TRACT SYMPMS: ICD-10-CM

## 2025-04-01 DIAGNOSIS — R97.20 ELEVATED PROSTATE, SPECIFIC ANTIGEN [PSA]: ICD-10-CM

## 2025-04-01 PROCEDURE — 99213 OFFICE O/P EST LOW 20 MIN: CPT

## 2025-04-01 PROCEDURE — G2211 COMPLEX E/M VISIT ADD ON: CPT

## 2025-04-05 ENCOUNTER — NON-APPOINTMENT (OUTPATIENT)
Age: 75
End: 2025-04-05

## 2025-04-07 ENCOUNTER — APPOINTMENT (OUTPATIENT)
Dept: CARDIOLOGY | Facility: CLINIC | Age: 75
End: 2025-04-07
Payer: MEDICARE

## 2025-04-07 VITALS
DIASTOLIC BLOOD PRESSURE: 60 MMHG | BODY MASS INDEX: 22.46 KG/M2 | HEIGHT: 74 IN | WEIGHT: 175 LBS | OXYGEN SATURATION: 98 % | SYSTOLIC BLOOD PRESSURE: 120 MMHG | HEART RATE: 57 BPM | RESPIRATION RATE: 14 BRPM

## 2025-04-07 DIAGNOSIS — I11.9 HYPERTENSIVE HEART DISEASE W/OUT HEART FAILURE: ICD-10-CM

## 2025-04-07 DIAGNOSIS — E78.2 MIXED HYPERLIPIDEMIA: ICD-10-CM

## 2025-04-07 DIAGNOSIS — I25.10 ATHEROSCLEROTIC HEART DISEASE OF NATIVE CORONARY ARTERY W/OUT ANGINA PECTORIS: ICD-10-CM

## 2025-04-07 DIAGNOSIS — I65.23 OCCLUSION AND STENOSIS OF BILATERAL CAROTID ARTERIES: ICD-10-CM

## 2025-04-07 DIAGNOSIS — R73.03 PREDIABETES.: ICD-10-CM

## 2025-04-07 PROCEDURE — 99204 OFFICE O/P NEW MOD 45 MIN: CPT

## 2025-04-07 PROCEDURE — 93000 ELECTROCARDIOGRAM COMPLETE: CPT

## 2025-06-09 ENCOUNTER — APPOINTMENT (OUTPATIENT)
Dept: CARDIOLOGY | Facility: CLINIC | Age: 75
End: 2025-06-09
Payer: MEDICARE

## 2025-06-09 PROCEDURE — 93356 MYOCRD STRAIN IMG SPCKL TRCK: CPT

## 2025-06-09 PROCEDURE — 93306 TTE W/DOPPLER COMPLETE: CPT

## 2025-06-27 ENCOUNTER — APPOINTMENT (OUTPATIENT)
Dept: CARDIOLOGY | Facility: CLINIC | Age: 75
End: 2025-06-27

## 2025-06-27 PROCEDURE — 93880 EXTRACRANIAL BILAT STUDY: CPT

## 2025-08-04 ENCOUNTER — APPOINTMENT (OUTPATIENT)
Dept: CARDIOLOGY | Facility: CLINIC | Age: 75
End: 2025-08-04
Payer: MEDICARE

## 2025-08-04 VITALS
SYSTOLIC BLOOD PRESSURE: 131 MMHG | HEIGHT: 74 IN | DIASTOLIC BLOOD PRESSURE: 63 MMHG | BODY MASS INDEX: 23.74 KG/M2 | HEART RATE: 56 BPM | WEIGHT: 185 LBS

## 2025-08-04 DIAGNOSIS — I10 ESSENTIAL (PRIMARY) HYPERTENSION: ICD-10-CM

## 2025-08-04 DIAGNOSIS — I34.0 NONRHEUMATIC MITRAL (VALVE) INSUFFICIENCY: ICD-10-CM

## 2025-08-04 DIAGNOSIS — I65.23 OCCLUSION AND STENOSIS OF BILATERAL CAROTID ARTERIES: ICD-10-CM

## 2025-08-04 DIAGNOSIS — R93.1 ABNORMAL FINDINGS ON DIAGNOSTIC IMAGING OF HEART AND CORONARY CIRCULATION: ICD-10-CM

## 2025-08-04 DIAGNOSIS — I25.10 ATHEROSCLEROTIC HEART DISEASE OF NATIVE CORONARY ARTERY W/OUT ANGINA PECTORIS: ICD-10-CM

## 2025-08-04 DIAGNOSIS — E78.2 MIXED HYPERLIPIDEMIA: ICD-10-CM

## 2025-08-04 DIAGNOSIS — I11.9 HYPERTENSIVE HEART DISEASE W/OUT HEART FAILURE: ICD-10-CM

## 2025-08-04 PROCEDURE — 99214 OFFICE O/P EST MOD 30 MIN: CPT

## 2025-08-04 PROCEDURE — G2211 COMPLEX E/M VISIT ADD ON: CPT
